# Patient Record
Sex: FEMALE | Race: BLACK OR AFRICAN AMERICAN | Employment: FULL TIME | URBAN - METROPOLITAN AREA
[De-identification: names, ages, dates, MRNs, and addresses within clinical notes are randomized per-mention and may not be internally consistent; named-entity substitution may affect disease eponyms.]

---
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
## 2020-06-02 NOTE — ROUTINE PROCESS
Discharge instructions given to patient including but not limited to signs and symptoms and who to call; restrictions and limitations; postpartum depression. All questions answered. Discharge supplies given to patient. Discharge paperwork signed in computer. 1 prescription given to patient. Normal for race

## 2020-08-27 ENCOUNTER — HOSPITAL ENCOUNTER (OUTPATIENT)
Dept: VASCULAR SURGERY | Age: 20
Discharge: HOME | End: 2020-08-27
Attending: FAMILY MEDICINE
Payer: COMMERCIAL

## 2020-08-27 DIAGNOSIS — I80.02 SUPERFICIAL PHLEBITIS OF LEFT LEG: ICD-10-CM

## 2020-08-27 PROCEDURE — 93971 EXTREMITY STUDY: CPT

## 2022-03-19 PROBLEM — Z37.9 NORMAL LABOR: Status: ACTIVE | Noted: 2019-12-11

## 2022-10-05 ENCOUNTER — TELEPHONE (OUTPATIENT)
Dept: FAMILY MEDICINE CLINIC | Age: 22
End: 2022-10-05

## 2022-10-05 ENCOUNTER — OFFICE VISIT (OUTPATIENT)
Dept: FAMILY MEDICINE CLINIC | Age: 22
End: 2022-10-05
Payer: COMMERCIAL

## 2022-10-05 VITALS
HEIGHT: 59 IN | HEART RATE: 76 BPM | WEIGHT: 120 LBS | OXYGEN SATURATION: 98 % | SYSTOLIC BLOOD PRESSURE: 107 MMHG | DIASTOLIC BLOOD PRESSURE: 70 MMHG | RESPIRATION RATE: 16 BRPM | BODY MASS INDEX: 24.19 KG/M2 | TEMPERATURE: 98.2 F

## 2022-10-05 DIAGNOSIS — Z00.00 ENCOUNTER FOR MEDICAL EXAMINATION TO ESTABLISH CARE: Primary | ICD-10-CM

## 2022-10-05 DIAGNOSIS — N94.89 SUPPRESSION OF MENSES: ICD-10-CM

## 2022-10-05 DIAGNOSIS — G47.00 INSOMNIA, UNSPECIFIED TYPE: ICD-10-CM

## 2022-10-05 DIAGNOSIS — R53.83 FATIGUE, UNSPECIFIED TYPE: ICD-10-CM

## 2022-10-05 PROCEDURE — 99203 OFFICE O/P NEW LOW 30 MIN: CPT | Performed by: FAMILY MEDICINE

## 2022-10-05 RX ORDER — DROSPIRENONE AND ETHINYL ESTRADIOL 0.03MG-3MG
1 KIT ORAL DAILY
Qty: 84 TABLET | Refills: 3 | Status: SHIPPED | OUTPATIENT
Start: 2022-10-05

## 2022-10-05 RX ORDER — NORETHINDRONE ACETATE AND ETHINYL ESTRADIOL 1MG-20(21)
KIT ORAL
COMMUNITY
End: 2022-10-05

## 2022-10-05 NOTE — PROGRESS NOTES
Orville Jones is a 25 y.o. female    Chief Complaint   Patient presents with    Establish Care     Patient is coming in to establish care. Patient would like a referral to see sleep medicine because she wakes up and imagining spiders. She has an appointment on 10/17/2022. Patient states that she does not like being with people and feels like she does not enjoy her sexual intercourse with her partner. No other concerns. 1. Have you been to the ER, urgent care clinic since your last visit? Hospitalized since your last visit? No    2. Have you seen or consulted any other health care providers outside of the [address] [place] [address] since your last visit? Include any pap smears or colon screening. No      Visit Vitals  /70 (BP 1 Location: Right upper arm, BP Patient Position: Sitting)   Pulse 76   Temp 98.2 °F (36.8 °C) (Oral)   Resp 16   Ht 4' 11\" (1.499 m)   Wt 120 lb (54.4 kg)   SpO2 98%   BMI 24.24 kg/m²           Health Maintenance Due   Topic Date Due    Hepatitis C Screening  Never done    Depression Screen  Never done    HPV Age 9Y-34Y (1 - 2-dose series) Never done    Pap Smear  Never done    COVID-19 Vaccine (3 - Booster for Moderna series) 02/21/2022    Flu Vaccine (1) 08/01/2022         Medication Reconciliation completed, changes noted.   Please  Update medication list.

## 2022-10-05 NOTE — PROGRESS NOTES
Seema  22. Medicine Office Visit     Assessment/ Plan: Miguelina Davies is a 25 y.o. female presenting for:    Establish Care - PMH, PSH, family history, social history, medications and allergies were reviewed and updated. -- recommended return to care for pap smear prn - also has seen obgyn     Insomnia - Acute on chronic. Based on description likely exacerbated by anxiety, poor sleep hygiene. Low suspicion for sleep apnea. Reviewed sleep hygiene. Discussed role of mood in sleep. Recommended regular physical activity, time outside. -- ferritin, TSH, CBC, Vitamin D  -- Referral to sleep medicine placed    Low Sex Drive - Acute on chronic. Discussed multifactorial etiology related to mood, changing role as mother, hormonal factors. Discussed options for depression/anxiety including medication, therapy and lifestyle changes as above. Suppression of Menses - Rx for OCPs. Discussed potential role in sex drive, may contribute to changes. Reviewed return precautions. 30 minutes were spent on the day of this encounter both with the patient and in related activities including chart review, care coordination and counseling. Patient instructions were discussed and/or provided in the AVS. The patient understands and agrees to the plan. RETURN TO CARE: prn   No future appointments. Subjective:  Chief Complaint   Patient presents with    Establish Care     Patient is coming in to establish care. Patient would like a referral to see sleep medicine because she wakes up and imagining spiders. She has an appointment on 10/17/2022. Patient states that she does not like being with people and feels like she does not enjoy her sexual intercourse with her partner. No other concerns. HPI: Miguelina Davies is a 25 y.o. female with PMH of insomnia here for establishing care.      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  - no caffeine  doesn't eat a lot - changed in high school  usually 1-2 meals per day (meat/vegetable, occasional breakfast, chips/fruit, no regular dairy)  has been on iron in the past     Sex  - vaginal delivery - a few stitches- no pain   - every 2-3 days bowel movements  does have to strain, not sure quality   - doesn't want to be touched, less social than before partner  - partner supportive   - switched birth controls - mood worse   - has had lots of UTIs - [facility] and [facility]  within last couple of months  random     I have reviewed the patients problem list, current medications, allergies, family, medical and social history. I have updated them as needed. Review of Systems  See HPI. Objective:  Visit Vitals  /70 (BP 1 Location: Right upper arm, BP Patient Position: Sitting)   Pulse 76   Temp 98.2 °F (36.8 °C) (Oral)   Resp 16   Ht 4' 11\" (1.499 m)   Wt 120 lb (54.4 kg)   SpO2 98%   BMI 24.24 kg/m²     Physical Exam  Vitals and nursing note reviewed. Constitutional:       General: She is not in acute distress. Appearance: Normal appearance. HENT:      Head: Normocephalic and atraumatic. Eyes:      Extraocular Movements: Extraocular movements intact. Conjunctiva/sclera: Conjunctivae normal.   Neck:      Comments: No thyromegaly  Cardiovascular:      Rate and Rhythm: Normal rate and regular rhythm. Heart sounds: No murmur heard. Pulmonary:      Effort: Pulmonary effort is normal. No respiratory distress. Breath sounds: Normal breath sounds.    Musculoskeletal: Cervical back: Neck supple. Right lower leg: No edema. Left lower leg: No edema. Lymphadenopathy:      Cervical: No cervical adenopathy. Skin:     General: Skin is warm and dry. Neurological:      General: No focal deficit present. Mental Status: She is alert. Psychiatric:         Mood and Affect: Mood normal.         Thought Content:  Thought content normal.       Dat Sung [place]

## 2022-10-06 LAB
25(OH)D3 SERPL-MCNC: 23.4 NG/ML (ref 30–100)
BASOPHILS # BLD: 0.1 K/UL (ref 0–0.1)
BASOPHILS NFR BLD: 1 % (ref 0–1)
DIFFERENTIAL METHOD BLD: ABNORMAL
EOSINOPHIL # BLD: 0.5 K/UL (ref 0–0.4)
EOSINOPHIL NFR BLD: 6 % (ref 0–7)
ERYTHROCYTE [DISTWIDTH] IN BLOOD BY AUTOMATED COUNT: 12.7 % (ref 11.5–14.5)
FERRITIN SERPL-MCNC: 24 NG/ML (ref 8–252)
HCT VFR BLD AUTO: 42.4 % (ref 35–47)
HGB BLD-MCNC: 14.1 G/DL (ref 11.5–16)
IMM GRANULOCYTES # BLD AUTO: 0 K/UL (ref 0–0.04)
IMM GRANULOCYTES NFR BLD AUTO: 0 % (ref 0–0.5)
LYMPHOCYTES # BLD: 3 K/UL (ref 0.8–3.5)
LYMPHOCYTES NFR BLD: 36 % (ref 12–49)
MCH RBC QN AUTO: 29.2 PG (ref 26–34)
MCHC RBC AUTO-ENTMCNC: 33.3 G/DL (ref 30–36.5)
MCV RBC AUTO: 87.8 FL (ref 80–99)
MONOCYTES # BLD: 0.7 K/UL (ref 0–1)
MONOCYTES NFR BLD: 8 % (ref 5–13)
NEUTS SEG # BLD: 4.1 K/UL (ref 1.8–8)
NEUTS SEG NFR BLD: 49 % (ref 32–75)
NRBC # BLD: 0 K/UL (ref 0–0.01)
NRBC BLD-RTO: 0 PER 100 WBC
PLATELET # BLD AUTO: 377 K/UL (ref 150–400)
PMV BLD AUTO: 10.5 FL (ref 8.9–12.9)
RBC # BLD AUTO: 4.83 M/UL (ref 3.8–5.2)
TSH SERPL DL<=0.05 MIU/L-ACNC: 0.67 UIU/ML (ref 0.36–3.74)
WBC # BLD AUTO: 8.3 K/UL (ref 3.6–11)

## 2023-04-27 ENCOUNTER — HOSPITAL ENCOUNTER (EMERGENCY)
Age: 23
Discharge: HOME | End: 2023-04-27
Attending: EMERGENCY MEDICINE
Payer: COMMERCIAL

## 2023-04-27 VITALS
RESPIRATION RATE: 18 BRPM | HEIGHT: 59 IN | HEART RATE: 105 BPM | DIASTOLIC BLOOD PRESSURE: 74 MMHG | SYSTOLIC BLOOD PRESSURE: 109 MMHG | WEIGHT: 125 LBS | TEMPERATURE: 98.5 F | OXYGEN SATURATION: 98 % | BODY MASS INDEX: 25.2 KG/M2

## 2023-04-27 DIAGNOSIS — J02.9 PHARYNGITIS, UNSPECIFIED ETIOLOGY: Primary | ICD-10-CM

## 2023-04-27 LAB — DEPRECATED S PYO AG THROAT QL EIA: NEGATIVE

## 2023-04-27 PROCEDURE — 87880 STREP A ASSAY W/OPTIC: CPT

## 2023-04-27 PROCEDURE — 74011250636 HC RX REV CODE- 250/636

## 2023-04-27 PROCEDURE — 87070 CULTURE OTHR SPECIMN AEROBIC: CPT

## 2023-04-27 PROCEDURE — 87147 CULTURE TYPE IMMUNOLOGIC: CPT

## 2023-04-27 PROCEDURE — 96372 THER/PROPH/DIAG INJ SC/IM: CPT

## 2023-04-27 PROCEDURE — 74011000250 HC RX REV CODE- 250

## 2023-04-27 PROCEDURE — 99284 EMERGENCY DEPT VISIT MOD MDM: CPT

## 2023-04-27 RX ORDER — LIDOCAINE HYDROCHLORIDE 20 MG/ML
15 SOLUTION OROPHARYNGEAL
Status: COMPLETED | OUTPATIENT
Start: 2023-04-27 | End: 2023-04-27

## 2023-04-27 RX ORDER — DEXAMETHASONE SODIUM PHOSPHATE 10 MG/ML
10 INJECTION INTRAMUSCULAR; INTRAVENOUS
Status: COMPLETED | OUTPATIENT
Start: 2023-04-27 | End: 2023-04-27

## 2023-04-27 RX ADMIN — LIDOCAINE HYDROCHLORIDE 15 ML: 20 SOLUTION ORAL; TOPICAL at 18:01

## 2023-04-27 RX ADMIN — DEXAMETHASONE SODIUM PHOSPHATE 10 MG: 10 INJECTION INTRAMUSCULAR; INTRAVENOUS at 18:35

## 2023-04-27 NOTE — DISCHARGE INSTRUCTIONS
Discussed visit today. I would keep taking your already prescribed antibiotic. Encourage fluids at home. You can also take Tylenol/Ibuprofen for your pain. Return to the ER with worsening symptoms.

## 2023-04-27 NOTE — ED PROVIDER NOTES
Patient is a 20-year-old female with history of anemia and sickle cell trait syndrome that presents to the ER with reports of nasal congestion and sore throat. She states this started on Saturday or Sunday. She had a telehealth visit with her primary care today and was prescribed amoxicillin. She states she took her first dose around 6 and it is not getting any better. She has not taken anything else for the pain. She admits to being able to swallow, but with pain. She denies drooling, chest pain, or shortness of breath. She is requesting a strep test at this time. She denies alcohol use, smoking/vaping, or illicit drug use. Sore Throat   Associated symptoms include congestion. Pertinent negatives include no diarrhea, no vomiting, no headaches and no shortness of breath. Past Medical History:   Diagnosis Date    Anemia     Sickle cell trait syndrome ([facility] [address]        No past surgical history on file. No family history on file.     Social History     Socioeconomic History    Marital status: SINGLE     Spouse name: Not on file    Number of children: Not on file    Years of education: Not on file    Highest education level: Not on file   Occupational History    Not on file   Tobacco Use    Smoking status: Never    Smokeless tobacco: Never   Substance and Sexual Activity    Alcohol use: Never    Drug use: Never    Sexual activity: Yes     Partners: Male     Birth control/protection: None, Pill   Other Topics Concern     Service Not Asked    Blood Transfusions Not Asked    Caffeine Concern Not Asked    Occupational Exposure Not Asked    Hobby Hazards Not Asked    Sleep Concern Not Asked    Stress Concern Not Asked    Weight Concern Not Asked    Special Diet Not Asked    Back Care Not Asked    Exercise Not Asked    Bike Helmet Not Asked    Seat Belt Not Asked    Self-Exams Not Asked   Social History Narrative    Not on file     Social Determinants of Health     Financial Resource Strain: Not on file Food Insecurity: Not on file   Transportation Needs: Not on file   Physical Activity: Not on file   Stress: Not on file   Social Connections: Not on file   Intimate Partner Violence: Not on file   Housing Stability: Not on file         ALLERGIES: Sulfa (sulfonamide antibiotics)    Review of Systems   Constitutional:  Negative for activity change, appetite change and fever. HENT:  Positive for congestion, postnasal drip and sore throat. Negative for rhinorrhea. Eyes:  Negative for discharge. Respiratory:  Negative for chest tightness and shortness of breath. Cardiovascular:  Negative for chest pain. Gastrointestinal:  Negative for abdominal pain, constipation, diarrhea, nausea and vomiting. Genitourinary:  Negative for decreased urine volume, difficulty urinating, dysuria, frequency, hematuria and urgency. Musculoskeletal:  Negative for myalgias. Skin:  Negative for pallor. Neurological:  Negative for dizziness and headaches. Psychiatric/Behavioral:  Negative for agitation and behavioral problems. Vitals:    04/27/23 1617   BP: 126/80   Pulse: (!) 116   Resp: 16   Temp: 98.5 °F (36.9 °C)   SpO2: 99%   Weight: 56.7 kg (125 lb)   Height: 4' 11\" (1.499 m)            Physical Exam  Vitals reviewed. Constitutional:       General: She is not in acute distress. Appearance: Normal appearance. She is well-developed. She is not ill-appearing or toxic-appearing. HENT:      Head: Normocephalic and atraumatic. Right Ear: Tympanic membrane and ear canal normal. No drainage, swelling or tenderness. No middle ear effusion. Tympanic membrane is not erythematous. Left Ear: Tympanic membrane and ear canal normal. No drainage, swelling or tenderness. No middle ear effusion. Tympanic membrane is not erythematous. Nose: Congestion present. Mouth/Throat:      Mouth: Mucous membranes are moist.      Pharynx: Oropharynx is clear. Uvula midline.  Posterior oropharyngeal erythema present. No pharyngeal swelling or uvula swelling. Tonsils: No tonsillar exudate or tonsillar abscesses. 0 on the right. 0 on the left. Comments: Airway intact. Patient able to tolerate secretions and saliva. Eyes:      Conjunctiva/sclera: Conjunctivae normal.      Pupils: Pupils are equal, round, and reactive to light. Cardiovascular:      Rate and Rhythm: Regular rhythm. Tachycardia present. Pulses: Normal pulses. Heart sounds: Normal heart sounds. Pulmonary:      Effort: Pulmonary effort is normal.      Breath sounds: Normal breath sounds. Abdominal:      General: Bowel sounds are normal.      Palpations: Abdomen is soft. Musculoskeletal:         General: No tenderness. Cervical back: Normal range of motion and neck supple. No tenderness. Skin:     General: Skin is warm. Capillary Refill: Capillary refill takes less than 2 seconds. Coloration: Skin is not pale. Neurological:      General: No focal deficit present. Mental Status: She is alert and oriented to person, place, and time. Motor: No weakness. Psychiatric:         Mood and Affect: Mood normal.         Behavior: Behavior normal.        Medical Decision Making  Patient is a 20-year-old female with history of anemia and sickle cell trait syndrome that presents to the ER with reports of nasal congestion and sore throat that started 2 days ago. Ddx: strep pharyngitis, upper respiratory infection, covid, influenza. Physical exanimation shows tachycardia, congestion, and posterior oropharynx erythema. Airway intact. Patient able to tolerate secretions and saliva. Patient declines covid and influenza tests at this time. Strep negative. Patient has already started the Amoxicillin and discussed this might alter the results and the patient still wanted a test. Patient has concerns about throat closing at home. Decadron given in the ER. Patient was able to tolerate applesauce and water while in the ER. Patient states she is unsure if her heart rate normally runs high or night. Patient seems anxious and agrees to being a little scared while in the ER today. We discussed some fluids, but patient declined because she does not like the catheter being left in her arm. Patient feels comfortable going home. Patient is agreeable to plan. All questions answered. Strict return precautions provided and discharged home at this time. Presentation, management, and disposition were discussed with the attending physician, Dr. Brice Amador, who is in agreement with plan of care. Problems Addressed:  Pharyngitis, unspecified etiology: acute illness or injury    Amount and/or Complexity of Data Reviewed  Labs: ordered. Decision-making details documented in ED Course. Risk  Prescription drug management.            Procedures

## 2023-04-27 NOTE — ED TRIAGE NOTES
Pt to ER with c/o sore throat since Saturday. Pt reports she had a virtual appt and was dx with strep. Pt reports she started amoxicillin this morning.

## 2023-04-29 LAB
BACTERIA SPEC CULT: ABNORMAL
BACTERIA SPEC CULT: ABNORMAL
SERVICE CMNT-IMP: ABNORMAL

## 2023-11-20 SDOH — ECONOMIC STABILITY: TRANSPORTATION INSECURITY
IN THE PAST 12 MONTHS, HAS LACK OF TRANSPORTATION KEPT YOU FROM MEETINGS, WORK, OR FROM GETTING THINGS NEEDED FOR DAILY LIVING?: NO

## 2023-11-20 SDOH — ECONOMIC STABILITY: INCOME INSECURITY: HOW HARD IS IT FOR YOU TO PAY FOR THE VERY BASICS LIKE FOOD, HOUSING, MEDICAL CARE, AND HEATING?: NOT HARD AT ALL

## 2023-11-20 SDOH — ECONOMIC STABILITY: HOUSING INSECURITY
IN THE LAST 12 MONTHS, WAS THERE A TIME WHEN YOU DID NOT HAVE A STEADY PLACE TO SLEEP OR SLEPT IN A SHELTER (INCLUDING NOW)?: NO

## 2023-11-20 SDOH — ECONOMIC STABILITY: FOOD INSECURITY: WITHIN THE PAST 12 MONTHS, THE FOOD YOU BOUGHT JUST DIDN'T LAST AND YOU DIDN'T HAVE MONEY TO GET MORE.: NEVER TRUE

## 2023-11-20 SDOH — ECONOMIC STABILITY: FOOD INSECURITY: WITHIN THE PAST 12 MONTHS, YOU WORRIED THAT YOUR FOOD WOULD RUN OUT BEFORE YOU GOT MONEY TO BUY MORE.: NEVER TRUE

## 2023-11-22 ENCOUNTER — OFFICE VISIT (OUTPATIENT)
Age: 23
End: 2023-11-22
Payer: COMMERCIAL

## 2023-11-22 VITALS
OXYGEN SATURATION: 98 % | RESPIRATION RATE: 18 BRPM | DIASTOLIC BLOOD PRESSURE: 80 MMHG | BODY MASS INDEX: 26.81 KG/M2 | SYSTOLIC BLOOD PRESSURE: 127 MMHG | HEIGHT: 59 IN | TEMPERATURE: 98.3 F | WEIGHT: 133 LBS | HEART RATE: 98 BPM

## 2023-11-22 DIAGNOSIS — G47.00 INSOMNIA, UNSPECIFIED TYPE: ICD-10-CM

## 2023-11-22 DIAGNOSIS — N94.89 SUPPRESSION OF MENSES: ICD-10-CM

## 2023-11-22 DIAGNOSIS — R05.8 POST-VIRAL COUGH SYNDROME: ICD-10-CM

## 2023-11-22 DIAGNOSIS — N94.89 OTHER SPECIFIED CONDITIONS ASSOCIATED WITH FEMALE GENITAL ORGANS AND MENSTRUAL CYCLE: ICD-10-CM

## 2023-11-22 DIAGNOSIS — N94.6 DYSMENORRHEA: Primary | ICD-10-CM

## 2023-11-22 PROBLEM — Z37.9 NORMAL LABOR: Status: RESOLVED | Noted: 2019-12-11 | Resolved: 2023-11-22

## 2023-11-22 PROCEDURE — 99213 OFFICE O/P EST LOW 20 MIN: CPT | Performed by: FAMILY MEDICINE

## 2023-11-22 RX ORDER — DROSPIRENONE AND ETHINYL ESTRADIOL 0.03MG-3MG
1 KIT ORAL DAILY
Qty: 84 TABLET | Refills: 4 | Status: SHIPPED | OUTPATIENT
Start: 2023-11-22

## 2023-11-22 ASSESSMENT — PATIENT HEALTH QUESTIONNAIRE - PHQ9
1. LITTLE INTEREST OR PLEASURE IN DOING THINGS: 0
SUM OF ALL RESPONSES TO PHQ QUESTIONS 1-9: 0
SUM OF ALL RESPONSES TO PHQ9 QUESTIONS 1 & 2: 0
2. FEELING DOWN, DEPRESSED OR HOPELESS: 0
SUM OF ALL RESPONSES TO PHQ QUESTIONS 1-9: 0

## 2024-10-19 ENCOUNTER — HOSPITAL ENCOUNTER (EMERGENCY)
Facility: HOSPITAL | Age: 24
Discharge: HOME | End: 2024-10-19
Attending: EMERGENCY MEDICINE
Payer: COMMERCIAL

## 2024-10-19 ENCOUNTER — APPOINTMENT (OUTPATIENT)
Dept: VASCULAR SURGERY | Facility: HOSPITAL | Age: 24
End: 2024-10-19
Payer: COMMERCIAL

## 2024-10-19 VITALS
SYSTOLIC BLOOD PRESSURE: 128 MMHG | OXYGEN SATURATION: 99 % | BODY MASS INDEX: 23.79 KG/M2 | TEMPERATURE: 98.2 F | RESPIRATION RATE: 16 BRPM | HEIGHT: 59 IN | HEART RATE: 96 BPM | WEIGHT: 118 LBS | DIASTOLIC BLOOD PRESSURE: 79 MMHG

## 2024-10-19 DIAGNOSIS — M79.604 RIGHT LEG PAIN: Primary | ICD-10-CM

## 2024-10-19 LAB — ECHO BSA: 1.49 M2

## 2024-10-19 PROCEDURE — 93971 EXTREMITY STUDY: CPT

## 2024-10-19 PROCEDURE — 99284 EMERGENCY DEPT VISIT MOD MDM: CPT

## 2024-10-19 ASSESSMENT — PAIN DESCRIPTION - DESCRIPTORS: DESCRIPTORS: ACHING

## 2024-10-19 ASSESSMENT — PAIN - FUNCTIONAL ASSESSMENT: PAIN_FUNCTIONAL_ASSESSMENT: 0-10

## 2024-10-19 ASSESSMENT — PAIN DESCRIPTION - ORIENTATION: ORIENTATION: RIGHT;LOWER

## 2024-10-19 ASSESSMENT — PAIN DESCRIPTION - LOCATION: LOCATION: LEG

## 2024-10-19 ASSESSMENT — PAIN SCALES - GENERAL
PAINLEVEL_OUTOF10: 0
PAINLEVEL_OUTOF10: 2

## 2024-10-19 NOTE — ED PROVIDER NOTES
[facility] EMERGENCY DEPT  EMERGENCY DEPARTMENT ENCOUNTER      Pt Name: Danay Escalera  MRN: [id number]  Birthdate 2000  Date of evaluation: 10/19/2024  Provider: Fransisco Acevedo PA-C    CHIEF COMPLAINT       Chief Complaint   Patient presents with    Leg Pain         HISTORY OF PRESENT ILLNESS   (Location/Symptom, Timing/Onset, Context/Setting, Quality, Duration, Modifying Factors, Severity)  Note limiting factors.   HPI  24-year-old female presenting to the emergency department today with chief complaint of right leg bruise.  She reports that she has had this bruise there for roughly 2 to 3 months.  She denies injury to the area.  Denies any history of bleeding or clotting disorder.  Reports mild intermittent pain but not severe, reports the reason she has come and is for the bruising and is unsure why it is not gone away.  She was hoping to have an ultrasound performed.  She denies smoking, recent immobilization, estrogen/birth control use.  Denies shortness of breath.  Reports she works for [employer] and then also does part-time [employer] .    Review of External Medical Records:     Nursing Notes were reviewed.    REVIEW OF SYSTEMS    (2-9 systems for level 4, 10 or more for level 5)     Review of Systems   Constitutional:         Right leg bruise/pain       Except as noted above the remainder of the review of systems was reviewed and negative.       PAST MEDICAL HISTORY     Past Medical History:   Diagnosis Date    Anemia     Sickle cell trait syndrome (HCC)          SURGICAL HISTORY     No past surgical history on file.      CURRENT MEDICATIONS       Discharge Medication List as of 10/19/2024  8:42 PM        CONTINUE these medications which have NOT CHANGED    Details   drospirenone-ethinyl estradiol (ANGELO) 3-0.03 MG TABS Take 1 tablet by mouth daily, Disp-84 tablet, R-4Normal             ALLERGIES     Sulfa antibiotics    FAMILY HISTORY     No family history on file.       SOCIAL HISTORY       Social History

## 2024-10-21 LAB — ECHO BSA: 1.49 M2

## 2025-02-24 ENCOUNTER — HOSPITAL ENCOUNTER (EMERGENCY)
Facility: HOSPITAL | Age: 25
Discharge: HOME | End: 2025-02-24
Payer: OTHER MISCELLANEOUS

## 2025-02-24 VITALS
WEIGHT: 113 LBS | OXYGEN SATURATION: 100 % | TEMPERATURE: 98.9 F | HEIGHT: 59 IN | RESPIRATION RATE: 18 BRPM | HEART RATE: 96 BPM | BODY MASS INDEX: 22.78 KG/M2 | SYSTOLIC BLOOD PRESSURE: 120 MMHG | DIASTOLIC BLOOD PRESSURE: 84 MMHG
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
## 2025-02-24 PROCEDURE — 99283 EMERGENCY DEPT VISIT LOW MDM: CPT

## 2025-02-24 RX ORDER — IBUPROFEN 600 MG/1
600 TABLET, FILM COATED ORAL EVERY 6 HOURS PRN
Qty: 20 TABLET | Refills: 0 | Status: SHIPPED | OUTPATIENT
Start: 2025-02-24

## 2025-02-24 RX ORDER — CYCLOBENZAPRINE HCL 10 MG
10 TABLET ORAL 3 TIMES DAILY PRN
Qty: 21 TABLET | Refills: 0 | Status: SHIPPED | OUTPATIENT
Start: 2025-02-24 | End: 2025-03-06

## 2025-02-24 ASSESSMENT — PAIN - FUNCTIONAL ASSESSMENT: PAIN_FUNCTIONAL_ASSESSMENT: 0-10

## 2025-02-24 ASSESSMENT — PAIN SCALES - GENERAL: PAINLEVEL_OUTOF10: 6

## 2025-02-24 ASSESSMENT — LIFESTYLE VARIABLES
HOW MANY STANDARD DRINKS CONTAINING ALCOHOL DO YOU HAVE ON A TYPICAL DAY: PATIENT DOES NOT DRINK
HOW OFTEN DO YOU HAVE A DRINK CONTAINING ALCOHOL: NEVER
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
## 2025-03-13 ENCOUNTER — HOSPITAL ENCOUNTER (EMERGENCY)
Facility: HOSPITAL | Age: 25
Discharge: HOME | End: 2025-03-13
Attending: EMERGENCY MEDICINE
Payer: COMMERCIAL

## 2025-03-13 VITALS
RESPIRATION RATE: 18 BRPM | WEIGHT: 113.54 LBS | SYSTOLIC BLOOD PRESSURE: 120 MMHG | TEMPERATURE: 98.8 F | OXYGEN SATURATION: 99 % | HEART RATE: 96 BPM | BODY MASS INDEX: 22.89 KG/M2 | DIASTOLIC BLOOD PRESSURE: 81 MMHG | HEIGHT: 59 IN

## 2025-03-13 DIAGNOSIS — R23.3 ABNORMAL BRUISING: Primary | ICD-10-CM

## 2025-03-13 LAB
ALBUMIN SERPL-MCNC: 3.6 G/DL (ref 3.5–5)
ALBUMIN/GLOB SERPL: 1.1 (ref 1.1–2.2)
ALP SERPL-CCNC: 74 U/L (ref 45–117)
ALT SERPL-CCNC: 13 U/L (ref 12–78)
ANION GAP SERPL CALC-SCNC: 6 MMOL/L (ref 2–12)
APTT PPP: 25.4 SEC (ref 22.1–31)
AST SERPL-CCNC: 16 U/L (ref 15–37)
BASOPHILS # BLD: 0.06 K/UL (ref 0–0.1)
BASOPHILS NFR BLD: 0.8 % (ref 0–1)
BILIRUB SERPL-MCNC: 0.5 MG/DL (ref 0.2–1)
BUN SERPL-MCNC: 9 MG/DL (ref 6–20)
BUN/CREAT SERPL: 13 (ref 12–20)
CALCIUM SERPL-MCNC: 8.8 MG/DL (ref 8.5–10.1)
CHLORIDE SERPL-SCNC: 107 MMOL/L (ref 97–108)
CO2 SERPL-SCNC: 25 MMOL/L (ref 21–32)
CREAT SERPL-MCNC: 0.71 MG/DL (ref 0.55–1.02)
DIFFERENTIAL METHOD BLD: ABNORMAL
EOSINOPHIL # BLD: 0.42 K/UL (ref 0–0.4)
EOSINOPHIL NFR BLD: 5.9 % (ref 0–7)
ERYTHROCYTE [DISTWIDTH] IN BLOOD BY AUTOMATED COUNT: 13.6 % (ref 11.5–14.5)
GLOBULIN SER CALC-MCNC: 3.4 G/DL (ref 2–4)
GLUCOSE SERPL-MCNC: 116 MG/DL (ref 65–100)
HCT VFR BLD AUTO: 36.3 % (ref 35–47)
HGB BLD-MCNC: 12.1 G/DL (ref 11.5–16)
IMM GRANULOCYTES # BLD AUTO: 0.02 K/UL (ref 0–0.04)
IMM GRANULOCYTES NFR BLD AUTO: 0.3 % (ref 0–0.5)
INR PPP: 1 (ref 0.9–1.1)
LYMPHOCYTES # BLD: 2.08 K/UL (ref 0.8–3.5)
LYMPHOCYTES NFR BLD: 29.4 % (ref 12–49)
MCH RBC QN AUTO: 27.6 PG (ref 26–34)
MCHC RBC AUTO-ENTMCNC: 33.3 G/DL (ref 30–36.5)
MCV RBC AUTO: 82.9 FL (ref 80–99)
MONOCYTES # BLD: 0.72 K/UL (ref 0–1)
MONOCYTES NFR BLD: 10.2 % (ref 5–13)
NEUTS SEG # BLD: 3.78 K/UL (ref 1.8–8)
NEUTS SEG NFR BLD: 53.4 % (ref 32–75)
NRBC # BLD: 0 K/UL (ref 0–0.01)
NRBC BLD-RTO: 0 PER 100 WBC
PLATELET # BLD AUTO: 335 K/UL (ref 150–400)
PMV BLD AUTO: 11.2 FL (ref 8.9–12.9)
POTASSIUM SERPL-SCNC: 3.8 MMOL/L (ref 3.5–5.1)
PROT SERPL-MCNC: 7 G/DL (ref 6.4–8.2)
PROTHROMBIN TIME: 10.5 SEC (ref 9.2–11.2)
RBC # BLD AUTO: 4.38 M/UL (ref 3.8–5.2)
SODIUM SERPL-SCNC: 138 MMOL/L (ref 136–145)
THERAPEUTIC RANGE: NORMAL SECS (ref 58–77)
WBC # BLD AUTO: 7.1 K/UL (ref 3.6–11)

## 2025-03-13 PROCEDURE — 85025 COMPLETE CBC W/AUTO DIFF WBC: CPT

## 2025-03-13 PROCEDURE — 99283 EMERGENCY DEPT VISIT LOW MDM: CPT

## 2025-03-13 PROCEDURE — 36415 COLL VENOUS BLD VENIPUNCTURE: CPT

## 2025-03-13 PROCEDURE — 85730 THROMBOPLASTIN TIME PARTIAL: CPT

## 2025-03-13 PROCEDURE — 85610 PROTHROMBIN TIME: CPT

## 2025-03-13 PROCEDURE — 80053 COMPREHEN METABOLIC PANEL: CPT

## 2025-03-13 PROCEDURE — 94761 N-INVAS EAR/PLS OXIMETRY MLT: CPT

## 2025-03-13 ASSESSMENT — ENCOUNTER SYMPTOMS
SHORTNESS OF BREATH: 0
ROS SKIN COMMENTS: BRUISING
ABDOMINAL PAIN: 0
VOMITING: 0
DIARRHEA: 0
NAUSEA: 0

## 2025-03-13 ASSESSMENT — PAIN SCALES - GENERAL: PAINLEVEL_OUTOF10: 0

## 2025-03-13 ASSESSMENT — PAIN - FUNCTIONAL ASSESSMENT: PAIN_FUNCTIONAL_ASSESSMENT: 0-10

## 2025-03-13 NOTE — ED PROVIDER NOTES
EMERGENCY DEPARTMENT HISTORY AND PHYSICAL EXAM     ----------------------------------------------------------------------------  Please note that this dictation was completed with [employer], the [employer] voice recognition software.  Quite often unanticipated grammatical, syntax, homophones, and other interpretive errors are inadvertently transcribed by the computer software.  Please disregard these errors.  Please excuse any errors that have escaped final proofreading  ----------------------------------------------------------------------------      Date: 3/13/2025  Patient Name: Danay Escalera      HISTORY OF PRESENT ILLNESS     Chief Complaint   Patient presents with    Bleeding/Bruising     Ambulatory with c/o RLE akle swelling and random bruising. Reports concern for blood clots.        History obtainted from:  Patient    Other independent source of history: None    HPI: Danay Escalera is a 24 y.o. female, with significant pmhx of anemia, sickle cell trait who presents via private vehicle to the ED with c/o intermittent bruising that she notes is not necessarily related to injury that has been ongoing since August.  Notes that she works at [employer] in the [place].  Denies chest pain, shortness of breath, nausea or vomiting.  Notes that she has a primary care doctor but has not seen them for this.      PCP: Pretty Rodriguez DO    Allergy List:   Allergies   Allergen Reactions    Sulfa Antibiotics Hives         CURRENT MEDICATIONS      Discharge Medication List as of 3/13/2025  4:03 AM        CONTINUE these medications which have NOT CHANGED    Details   ibuprofen (IBU) 600 MG tablet Take 1 tablet by mouth every 6 hours as needed for Pain, Disp-20 tablet, R-0Normal      drospirenone-ethinyl estradiol (ANGELO) 3-0.03 MG TABS Take 1 tablet by mouth daily, Disp-84 tablet, R-4Normal             PAST HISTORY       Past Medical History:  Past Medical History:   Diagnosis Date    Anemia     Sickle cell trait syndrome

## 2025-03-13 NOTE — DISCHARGE INSTRUCTIONS
It was a pleasure taking care of you in our Emergency Department today.  We know that when you come to [facility], you are entrusting us with your health, comfort, and safety.  Our physicians and nurses honor that trust, and truly appreciate the opportunity to care for you and your loved ones.      We also value your feedback.  If you receive a survey about your Emergency Department experience today, please fill it out.  We care about our patients' feedback, and we listen to what you have to say.  Thank you!      Dr. Faiza Thomas MD.

## 2025-03-14 ENCOUNTER — TELEPHONE (OUTPATIENT)
Age: 25
End: 2025-03-14

## 2025-03-14 NOTE — TELEPHONE ENCOUNTER
Good Afternoon Khadra,   Can you please schedule an appointment for this patient to come in for a visit. She has not been seen in almost 2 year sand is requesting referrals. She would have to be seen for this.   Thank you

## 2025-03-14 NOTE — TELEPHONE ENCOUNTER
----- Message from Azalea PARSON sent at 3/14/2025  7:59 AM EDT -----  Regarding: E[facility] Referral Request  E[facility] Referral Request    Reason for referral request: Specialty Provider    Specialist/Lab/Test patient is requesting (if known): Hematologist & Uncology     Specialist Phone Number (if applicable): n/a    Additional Information Patient is requesting to be referred to a specialist doctor that is specified on the request since the one that she was referred to is far away from her location and would prefer a much nearer specialist doctor.   --------------------------------------------------------------------------------------------------------------------------    Relationship to Patient: Self     Call Back Information: OK to leave message on voicemail  Preferred Call Back Number: Phone [phone]

## 2025-03-20 ENCOUNTER — OFFICE VISIT (OUTPATIENT)
Age: 25
End: 2025-03-20
Payer: COMMERCIAL

## 2025-03-20 ENCOUNTER — TELEPHONE (OUTPATIENT)
Age: 25
End: 2025-03-20

## 2025-03-20 VITALS
SYSTOLIC BLOOD PRESSURE: 110 MMHG | WEIGHT: 111 LBS | RESPIRATION RATE: 18 BRPM | TEMPERATURE: 98.1 F | HEIGHT: 59 IN | BODY MASS INDEX: 22.38 KG/M2 | HEART RATE: 101 BPM | DIASTOLIC BLOOD PRESSURE: 74 MMHG | OXYGEN SATURATION: 98 %

## 2025-03-20 DIAGNOSIS — D57.3 SICKLE CELL TRAIT: Primary | ICD-10-CM

## 2025-03-20 DIAGNOSIS — R39.15 URINARY URGENCY: ICD-10-CM

## 2025-03-20 DIAGNOSIS — N93.9 ABNORMAL UTERINE BLEEDING: ICD-10-CM

## 2025-03-20 LAB
BILIRUBIN, URINE, POC: NEGATIVE
BLOOD URINE, POC: NORMAL
GLUCOSE URINE, POC: NEGATIVE
HCG, PREGNANCY, URINE, POC: NEGATIVE
KETONES, URINE, POC: NEGATIVE
LEUKOCYTE ESTERASE, URINE, POC: NEGATIVE
NITRITE, URINE, POC: NEGATIVE
PH, URINE, POC: 5.5 (ref 4.6–8)
PROTEIN,URINE, POC: NORMAL
SPECIFIC GRAVITY, URINE, POC: 1.01 (ref 1–1.03)
URINALYSIS CLARITY, POC: CLEAR
URINALYSIS COLOR, POC: YELLOW
UROBILINOGEN, POC: NORMAL
VALID INTERNAL CONTROL, POC: NORMAL

## 2025-03-20 PROCEDURE — 81003 URINALYSIS AUTO W/O SCOPE: CPT

## 2025-03-20 PROCEDURE — 99214 OFFICE O/P EST MOD 30 MIN: CPT

## 2025-03-20 PROCEDURE — 81025 URINE PREGNANCY TEST: CPT

## 2025-03-20 SDOH — ECONOMIC STABILITY: FOOD INSECURITY: WITHIN THE PAST 12 MONTHS, YOU WORRIED THAT YOUR FOOD WOULD RUN OUT BEFORE YOU GOT MONEY TO BUY MORE.: NEVER TRUE

## 2025-03-20 SDOH — ECONOMIC STABILITY: FOOD INSECURITY: WITHIN THE PAST 12 MONTHS, THE FOOD YOU BOUGHT JUST DIDN'T LAST AND YOU DIDN'T HAVE MONEY TO GET MORE.: NEVER TRUE

## 2025-03-20 ASSESSMENT — PATIENT HEALTH QUESTIONNAIRE - PHQ9
1. LITTLE INTEREST OR PLEASURE IN DOING THINGS: NOT AT ALL
SUM OF ALL RESPONSES TO PHQ QUESTIONS 1-9: 0
2. FEELING DOWN, DEPRESSED OR HOPELESS: NOT AT ALL

## 2025-03-20 NOTE — PROGRESS NOTES
I saw and evaluated the patient, performing the key elements of the service. I discussed the findings, assessment and plan with the resident and agree with the resident's findings and plan as documented in the resident's note.    Regina Prakash MD

## 2025-03-20 NOTE — TELEPHONE ENCOUNTER
Pt calling, after trying to set up the initial appt with the [facility], to request that we send the Hematology referral, visit notes, and recent labs (including those from her recent ED visit) to their office in [place].Once they receive this information, they will allow her to be scheduled.    [facility]  [address] [place] [address], [address]  [place] [address]  P [phone]  F [phone]

## 2025-03-20 NOTE — TELEPHONE ENCOUNTER
Called patent back per Beni's Encounter dated 03/20/2025. To inform we have faxed over the referral order and office note to  [facility].    No ; LVM      If patient calls, provide information stated above.

## 2025-03-20 NOTE — PROGRESS NOTES
[facility]  [address] [place] [address]   Office [phone], Fax [phone]      Chief Complaint:     Danay Escalera is a 24 y.o. female that presents for:   Chief Complaint   Patient presents with    Bleeding/Bruising       Subjective:   HPI:    #Bruising, h/o sickle cell trait  - bruising started in September  -  last week noted calf pain and bruising in calves legs felt hard in area of bruises, went to ER the next day. Pain got up to pain 10/10. Worst walking in AM, helps a little walking. Ibuprofen helped some. Has some post residual allodynia  - Works 4 hours 4 days per week at [employer] on her feet.   - 2nd time going to ER for pain, 1st in September    Menstrual history  - Usually every 20-28 days  - Changes pads 4-5x per day.   - 1-3 weeks ago had some intermenstrual spotting  - Last period was 3/11  - Having urinary urgency, says this does not feel like prior UTIs    Past medical history, social history, medications, and allergies personally reviewed.  Past Medical History:   Diagnosis Date    Anemia     Sickle cell trait syndrome         Social Hx:   Social History     Socioeconomic History    Marital status: Single   Tobacco Use    Smoking status: Never    Smokeless tobacco: Never   Substance and Sexual Activity    Alcohol use: Never    Drug use: Never    Sexual activity: Not Currently     Partners: Male     Birth control/protection: Pill, None     Social Drivers of Health     Food Insecurity: No Food Insecurity (3/20/2025)    Hunger Vital Sign     Worried About Running Out of Food in the Last Year: Never true     Ran Out of Food in the Last Year: Never true   Transportation Needs: No Transportation Needs (3/20/2025)    PRAPARE - Transportation     Lack of Transportation (Medical): No     Lack of Transportation (Non-Medical): No   Housing Stability: Low Risk  (3/20/2025)    Housing Stability Vital Sign     Unable to Pay for Housing in the Last Year: No

## 2025-03-20 NOTE — PROGRESS NOTES
Room 20    Patient is accompanied by self. I have received verbal consent from Danay Escalera to discuss any/all medical information while they are present in the room.    Identified pt with two pt identifiers(name and ). Reviewed record in preparation for visit and have obtained necessary documentation.  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 )     Sexually active 6 days ago - did not use protection     Health Maintenance Due   Topic    Varicella vaccine (1 of  - 13+ 2-dose series)    HPV vaccine (1 - 3-dose series)    Hepatitis C screen     Hepatitis B vaccine (1 of 3 - 19+ 3-dose series)    Chlamydia/GC screen     Flu vaccine (1)    COVID-19 Vaccine (3 - 2024-25 season)    Depression Screen        Vitals:    25 0811   BP: 110/74   BP Site: Left Upper Arm   Patient Position: Sitting   BP Cuff Size: Small Adult   Pulse: (!) 101   Resp: 18   Temp: 98.1 °F (36.7 °C)   TempSrc: Temporal   SpO2: 98%   Weight: 50.3 kg (111 lb)   Height: 1.499 m (4' 11\")       Social Determinants Of Health:       SDOH screening completed at visit.  Resources Declined.   See AVS for attached resources, if requested.    Coordination of Care Questionnaire:       \"Have you been to the ER, urgent care clinic since your last visit?  Hospitalized since your last visit?\"    Yes , 2025 abnormal bruising ( labs drawn and they wanted her to see hematology )    “Have you seen or consulted any other health care providers outside of [facility] since your last visit?”    NO            Click Here for Release of Records Request

## 2025-03-24 ENCOUNTER — RESULTS FOLLOW-UP (OUTPATIENT)
Age: 25
End: 2025-03-24

## 2025-03-24 LAB
C TRACH RRNA SPEC QL NAA+PROBE: NEGATIVE
N GONORRHOEA RRNA SPEC QL NAA+PROBE: NEGATIVE

## 2025-03-26 ENCOUNTER — HOSPITAL ENCOUNTER (EMERGENCY)
Facility: HOSPITAL | Age: 25
Discharge: HOME | End: 2025-03-26
Attending: EMERGENCY MEDICINE
Payer: COMMERCIAL

## 2025-03-26 VITALS
HEIGHT: 59 IN | RESPIRATION RATE: 16 BRPM | WEIGHT: 110 LBS | SYSTOLIC BLOOD PRESSURE: 131 MMHG | BODY MASS INDEX: 22.18 KG/M2 | OXYGEN SATURATION: 100 % | TEMPERATURE: 97.9 F | HEART RATE: 98 BPM | DIASTOLIC BLOOD PRESSURE: 81 MMHG

## 2025-03-26 DIAGNOSIS — M79.89 LEG SWELLING: Primary | ICD-10-CM

## 2025-03-26 LAB
ALBUMIN SERPL-MCNC: 4 G/DL (ref 3.5–5.2)
ALBUMIN/GLOB SERPL: 1.2 (ref 1.1–2.2)
ALP SERPL-CCNC: 95 U/L (ref 35–104)
ALT SERPL-CCNC: 6 U/L (ref 10–35)
ANION GAP SERPL CALC-SCNC: 8 MMOL/L (ref 2–12)
APPEARANCE UR: CLEAR
AST SERPL-CCNC: 11 U/L (ref 10–35)
BACTERIA URNS QL MICRO: ABNORMAL /HPF
BASOPHILS # BLD: 0.06 K/UL (ref 0–0.1)
BASOPHILS NFR BLD: 0.8 % (ref 0–1)
BILIRUB SERPL-MCNC: 0.3 MG/DL (ref 0.2–1)
BILIRUB UR QL: NEGATIVE
BUN SERPL-MCNC: 13 MG/DL (ref 6–20)
BUN/CREAT SERPL: 20 (ref 12–20)
CALCIUM SERPL-MCNC: 9.5 MG/DL (ref 8.6–10)
CHLORIDE SERPL-SCNC: 105 MMOL/L (ref 98–107)
CO2 SERPL-SCNC: 27 MMOL/L (ref 22–29)
COLOR UR: ABNORMAL
CREAT SERPL-MCNC: 0.64 MG/DL (ref 0.5–0.9)
DIFFERENTIAL METHOD BLD: NORMAL
EOSINOPHIL # BLD: 0.36 K/UL (ref 0–0.4)
EOSINOPHIL NFR BLD: 4.6 % (ref 0–7)
EPITH CASTS URNS QL MICRO: ABNORMAL /LPF
ERYTHROCYTE [DISTWIDTH] IN BLOOD BY AUTOMATED COUNT: 13 % (ref 11.5–14.5)
GLOBULIN SER CALC-MCNC: 3.3 G/DL (ref 2–4)
GLUCOSE SERPL-MCNC: 109 MG/DL (ref 65–100)
GLUCOSE UR STRIP.AUTO-MCNC: NEGATIVE MG/DL
HCG UR QL: NEGATIVE
HCT VFR BLD AUTO: 36.9 % (ref 35–47)
HGB BLD-MCNC: 12.4 G/DL (ref 11.5–16)
HGB UR QL STRIP: NEGATIVE
IMM GRANULOCYTES # BLD AUTO: 0.03 K/UL (ref 0–0.04)
IMM GRANULOCYTES NFR BLD AUTO: 0.4 % (ref 0–0.5)
KETONES UR QL STRIP.AUTO: ABNORMAL MG/DL
LEUKOCYTE ESTERASE UR QL STRIP.AUTO: NEGATIVE
LYMPHOCYTES # BLD: 1.66 K/UL (ref 0.8–3.5)
LYMPHOCYTES NFR BLD: 21.3 % (ref 12–49)
MCH RBC QN AUTO: 27.4 PG (ref 26–34)
MCHC RBC AUTO-ENTMCNC: 33.6 G/DL (ref 30–36.5)
MCV RBC AUTO: 81.6 FL (ref 80–99)
MONOCYTES # BLD: 0.64 K/UL (ref 0–1)
MONOCYTES NFR BLD: 8.2 % (ref 5–13)
NEUTS SEG # BLD: 5.04 K/UL (ref 1.8–8)
NEUTS SEG NFR BLD: 64.7 % (ref 32–75)
NITRITE UR QL STRIP.AUTO: NEGATIVE
NRBC # BLD: 0 K/UL (ref 0–0.01)
NRBC BLD-RTO: 0 PER 100 WBC
PH UR STRIP: 6 (ref 5–8)
PLATELET # BLD AUTO: 380 K/UL (ref 150–400)
PMV BLD AUTO: 9.4 FL (ref 8.9–12.9)
POTASSIUM SERPL-SCNC: 4.4 MMOL/L (ref 3.5–5.1)
PROT SERPL-MCNC: 7.3 G/DL (ref 6.4–8.3)
PROT UR STRIP-MCNC: NEGATIVE MG/DL
RBC # BLD AUTO: 4.52 M/UL (ref 3.8–5.2)
RBC #/AREA URNS HPF: ABNORMAL /HPF
SODIUM SERPL-SCNC: 140 MMOL/L (ref 136–145)
SP GR UR REFRACTOMETRY: 1.02 (ref 1–1.03)
SPECIMEN HOLD: NORMAL
UROBILINOGEN UR QL STRIP.AUTO: 2 EU/DL (ref 0.2–1)
WBC # BLD AUTO: 7.8 K/UL (ref 3.6–11)
WBC URNS QL MICRO: ABNORMAL /HPF (ref 0–4)

## 2025-03-26 PROCEDURE — 99283 EMERGENCY DEPT VISIT LOW MDM: CPT

## 2025-03-26 PROCEDURE — 36415 COLL VENOUS BLD VENIPUNCTURE: CPT

## 2025-03-26 PROCEDURE — 80053 COMPREHEN METABOLIC PANEL: CPT

## 2025-03-26 PROCEDURE — 85025 COMPLETE CBC W/AUTO DIFF WBC: CPT

## 2025-03-26 PROCEDURE — 81025 URINE PREGNANCY TEST: CPT

## 2025-03-26 PROCEDURE — 81001 URINALYSIS AUTO W/SCOPE: CPT

## 2025-03-26 ASSESSMENT — PAIN - FUNCTIONAL ASSESSMENT: PAIN_FUNCTIONAL_ASSESSMENT: 0-10

## 2025-03-26 ASSESSMENT — LIFESTYLE VARIABLES
HOW OFTEN DO YOU HAVE A DRINK CONTAINING ALCOHOL: NEVER
HOW MANY STANDARD DRINKS CONTAINING ALCOHOL DO YOU HAVE ON A TYPICAL DAY: PATIENT DOES NOT DRINK

## 2025-03-26 ASSESSMENT — PAIN SCALES - GENERAL: PAINLEVEL_OUTOF10: 5

## 2025-03-26 ASSESSMENT — PAIN DESCRIPTION - PAIN TYPE: TYPE: ACUTE PAIN

## 2025-03-26 ASSESSMENT — PAIN DESCRIPTION - LOCATION: LOCATION: ANKLE

## 2025-03-26 ASSESSMENT — PAIN DESCRIPTION - ORIENTATION: ORIENTATION: LEFT;RIGHT

## 2025-03-27 ENCOUNTER — TELEPHONE (OUTPATIENT)
Age: 25
End: 2025-03-27

## 2025-03-27 DIAGNOSIS — D57.3 SICKLE CELL TRAIT: Primary | ICD-10-CM

## 2025-03-27 NOTE — DISCHARGE INSTRUCTIONS
You were seen today in the emergency department for leg swelling.  Overall your workup is very reassuring with normal kidney function, electrolytes and blood counts.  Is likely the leg swelling is from venous stasis.  I recommend elevating your legs after being on your feet and wearing compression stockings when you are spending longer periods of time on your feet.  Follow-up closely with your primary care provider.  Return for any new or worsening symptoms.

## 2025-03-27 NOTE — ED PROVIDER NOTES
[facility] EMERGENCY DEPARTMENT  EMERGENCY DEPARTMENT ENCOUNTER      Pt Name: Danay Escalera  MRN: [id number]  Birthdate 2000  Date of evaluation: 3/26/2025  Provider: Neeru Bray PA-C    CHIEF COMPLAINT       Chief Complaint   Patient presents with    Ankle Pain         HISTORY OF PRESENT ILLNESS   (Location/Symptom, Timing/Onset, Context/Setting, Quality, Duration, Modifying Factors, Severity)  Note limiting factors.   Danay Escalera is a 24 y.o. female with history of  has a past medical history of Anemia and Sickle cell trait syndrome. who presents from home to [place] ED with cc of bilateral lower leg swelling x 1 week.  Patient also requesting pregnancy test.  She does note she was on her feet working at [employer] for 4 hours prior to the swelling yesterday.  The swelling has improved since.  She also notes abnormal bruising recently.  She has been referred to heme-onc.  She is otherwise healthy.  Denies chest pain. No recent surgeries, hospitalizations, travel, hx of malignancy, exogenous estrogen use, hemoptysis, or hx of PE/DVT.      PCP: Pretty Rodriguez DO    There are no other complaints, changes or physical findings at this time.                Review of External Medical Records:     Nursing Notes were reviewed.    REVIEW OF SYSTEMS    (2-9 systems for level 4, 10 or more for level 5)     Review of Systems   Cardiovascular:  Positive for leg swelling.       Except as noted above the remainder of the review of systems was reviewed and negative.       PAST MEDICAL HISTORY     Past Medical History:   Diagnosis Date    Anemia     Sickle cell trait syndrome          SURGICAL HISTORY     No past surgical history on file.      CURRENT MEDICATIONS       Discharge Medication List as of 3/26/2025 10:26 PM        CONTINUE these medications which have NOT CHANGED    Details   ibuprofen (IBU) 600 MG tablet Take 1 tablet by mouth every 6 hours as needed for Pain, Disp-20 tablet, R-0Normal

## 2025-03-27 NOTE — ED TRIAGE NOTES
Patient ambulatory to triage, Patient co bilateral ankle pain, swelling and tingling x 2 days.  Reports irregular period, concerned for pregnancy.  Patient states she was neg for gonorrhea and chylemia and pregnancy. Patients period began sunday

## 2025-03-27 NOTE — TELEPHONE ENCOUNTER
Hi Dr. Gamez,    Received a call from Joanna at [facility] she stated that they do not treat sickle cell at their clinic. She stated that you can place a new referral to [facility].